# Patient Record
(demographics unavailable — no encounter records)

---

## 2018-12-21 NOTE — NUR
Patient discharged to home WITH PARENTS in stable condition. Written and verbal 
after care instructions given. Patient verbalizes understanding of instruction.

## 2018-12-21 NOTE — NUR
BIBPARENTS FROM HOME C/O FEVER X 1 DAY. CONGESTION/COUGH X 1 WEEK.  NO TYLENOL 
GIVEN. PARENTS REFUSED RECTAL TEMP.  BABY IS QUIET, LAYING IN MOTHER'S ARMS ON 
GURNEY.  SKIN WARM, DRY, INTACT.  NO ACUTE DISTRESS NOTED.  NO OTHER COMPLAINTS 
AT THIS TIME.  READY FOR EVAL.